# Patient Record
Sex: MALE | NOT HISPANIC OR LATINO
[De-identification: names, ages, dates, MRNs, and addresses within clinical notes are randomized per-mention and may not be internally consistent; named-entity substitution may affect disease eponyms.]

---

## 2022-04-12 PROBLEM — Z00.00 ENCOUNTER FOR PREVENTIVE HEALTH EXAMINATION: Status: ACTIVE | Noted: 2022-04-12

## 2022-04-13 ENCOUNTER — NON-APPOINTMENT (OUTPATIENT)
Age: 77
End: 2022-04-13

## 2022-04-14 ENCOUNTER — APPOINTMENT (OUTPATIENT)
Dept: UROLOGY | Facility: CLINIC | Age: 77
End: 2022-04-14
Payer: MEDICARE

## 2022-04-14 ENCOUNTER — NON-APPOINTMENT (OUTPATIENT)
Age: 77
End: 2022-04-14

## 2022-04-14 VITALS
RESPIRATION RATE: 18 BRPM | HEART RATE: 90 BPM | WEIGHT: 203 LBS | HEIGHT: 71 IN | SYSTOLIC BLOOD PRESSURE: 137 MMHG | TEMPERATURE: 97.5 F | BODY MASS INDEX: 28.42 KG/M2 | DIASTOLIC BLOOD PRESSURE: 87 MMHG

## 2022-04-14 DIAGNOSIS — Z78.9 OTHER SPECIFIED HEALTH STATUS: ICD-10-CM

## 2022-04-14 DIAGNOSIS — E78.5 HYPERLIPIDEMIA, UNSPECIFIED: ICD-10-CM

## 2022-04-14 DIAGNOSIS — Z80.1 FAMILY HISTORY OF MALIGNANT NEOPLASM OF TRACHEA, BRONCHUS AND LUNG: ICD-10-CM

## 2022-04-14 DIAGNOSIS — Z87.891 PERSONAL HISTORY OF NICOTINE DEPENDENCE: ICD-10-CM

## 2022-04-14 DIAGNOSIS — J30.2 OTHER SEASONAL ALLERGIC RHINITIS: ICD-10-CM

## 2022-04-14 DIAGNOSIS — Z82.3 FAMILY HISTORY OF STROKE: ICD-10-CM

## 2022-04-14 LAB
BILIRUB UR QL STRIP: NEGATIVE
CLARITY UR: CLEAR
COLLECTION METHOD: NORMAL
GLUCOSE UR-MCNC: NEGATIVE
HCG UR QL: 0.2 EU/DL
HGB UR QL STRIP.AUTO: NEGATIVE
KETONES UR-MCNC: NEGATIVE
LEUKOCYTE ESTERASE UR QL STRIP: NEGATIVE
NITRITE UR QL STRIP: NEGATIVE
PH UR STRIP: 7
PROT UR STRIP-MCNC: NEGATIVE
SP GR UR STRIP: 1.01

## 2022-04-14 PROCEDURE — 81003 URINALYSIS AUTO W/O SCOPE: CPT | Mod: QW

## 2022-04-14 PROCEDURE — 76857 US EXAM PELVIC LIMITED: CPT

## 2022-04-14 PROCEDURE — 99205 OFFICE O/P NEW HI 60 MIN: CPT

## 2022-04-14 RX ORDER — EVOLOCUMAB 140 MG/ML
140 INJECTION, SOLUTION SUBCUTANEOUS
Refills: 0 | Status: ACTIVE | COMMUNITY

## 2022-04-14 RX ORDER — ASPIRIN 81 MG/1
81 TABLET ORAL
Refills: 0 | Status: ACTIVE | COMMUNITY

## 2022-04-14 NOTE — ADDENDUM
[FreeTextEntry1] : A portion of this note was written by [Zia Robledo] on 04/12/2022 acting as a scribe for Dr. Mckenna. \par \par I have personally reviewed the chart and agree that the record accurately reflects my personal performance of the history, physical exam, assessment, and plan.

## 2022-04-14 NOTE — PHYSICAL EXAM
[General Appearance - Well Developed] : well developed [General Appearance - Well Nourished] : well nourished [Normal Appearance] : normal appearance [Well Groomed] : well groomed [Edema] : no peripheral edema [General Appearance - In No Acute Distress] : no acute distress [Respiration, Rhythm And Depth] : normal respiratory rhythm and effort [Exaggerated Use Of Accessory Muscles For Inspiration] : no accessory muscle use [Abdomen Soft] : soft [Abdomen Tenderness] : non-tender [Costovertebral Angle Tenderness] : no ~M costovertebral angle tenderness [Urinary Bladder Findings] : the bladder was normal on palpation [Urethral Meatus] : meatus normal [Scrotum] : the scrotum was normal [No Prostate Nodules] : no prostate nodules [Testes Mass (___cm)] : there were no testicular masses [Normal Station and Gait] : the gait and station were normal for the patient's age [] : no rash [No Focal Deficits] : no focal deficits [Oriented To Time, Place, And Person] : oriented to person, place, and time [Mood] : the mood was normal [Affect] : the affect was normal [Not Anxious] : not anxious [No Palpable Adenopathy] : no palpable adenopathy [Heart Rate And Rhythm] : Heart rate and rhythm were normal [Abdomen Mass (___ Cm)] : no abdominal mass palpated [Penis Abnormality] : normal uncircumcised penis [Epididymis] : the epididymides were normal [Testes Tenderness] : no tenderness of the testes [Prostate Tenderness] : the prostate was not tender [Skin Color & Pigmentation] : normal skin color and pigmentation [FreeTextEntry1] : Small left hydrocele.

## 2022-04-14 NOTE — ASSESSMENT
[FreeTextEntry1] : I discussed the findings and options with Hakan PEDRO JULIÁN in detail and reviewed the available records.  A repeat PSA is pending.  If the PSA elevation persists, the next step will be a prostate MRI possibly followed by a prostate biopsy. I described these procedures to him, including the risks benefits and alternatives.\par \par No intervention is warranted for his minimal lower urinary tract symptoms.\par \par Regarding erectile dysfunction, I have provided Dr. Salinas with a written prescription for Viagra with the appropriate instructions.\par \par

## 2022-04-14 NOTE — LETTER BODY
[Dear  ___] : Dear  [unfilled], [Consult Letter:] : I had the pleasure of evaluating your patient, [unfilled]. [Please see my note below.] : Please see my note below. [Consult Closing:] : Thank you very much for allowing me to participate in the care of this patient.  If you have any questions, please do not hesitate to contact me. [Sincerely,] : Sincerely, [FreeTextEntry3] : Margarito Mckenna MD, FACS

## 2022-04-14 NOTE — HISTORY OF PRESENT ILLNESS
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  He presents with a recent PSA elevation (see below).\par \par From his general urologic history Dr. Gallego reports moderate stable urinary symptoms (predominantly obstructive) without nocturia.\par IPSS: 8/35\par Sono (performed to assess bladder emptying): \par \par Dr. Gallego has mild erectile dysfunction.  He has used Viagra 50mg in the past.\par \par He is taking DHEA for hypogonadism.\par \par PSAs: 3/10/22--4.9; 2/16/21--2.8; 2/25/20--2.9; 12/18/18--3.5; 11/09/17--4.0; 10/17/16--2.9; 5/19/15--3.0;

## 2022-04-15 ENCOUNTER — NON-APPOINTMENT (OUTPATIENT)
Age: 77
End: 2022-04-15

## 2022-04-15 LAB — PSA SERPL-MCNC: 4.88 NG/ML

## 2022-04-18 LAB — BACTERIA UR CULT: NORMAL

## 2022-04-20 ENCOUNTER — NON-APPOINTMENT (OUTPATIENT)
Age: 77
End: 2022-04-20

## 2022-05-03 ENCOUNTER — APPOINTMENT (OUTPATIENT)
Dept: UROLOGY | Facility: CLINIC | Age: 77
End: 2022-05-03
Payer: MEDICARE

## 2022-05-03 PROCEDURE — 99213 OFFICE O/P EST LOW 20 MIN: CPT | Mod: 95

## 2022-05-04 NOTE — ASSESSMENT
[FreeTextEntry1] : I discussed the findings and options with Mr. PEDRO GALLEGO in detail.\par \par Based on the PI-RADS 2 lesion and normal PSA density, Mr. Gallego can consider either repeating the PSA in four months or proceed with prostate biopsies. \par \par The two biopsy techniques (transrectal and transperineal) were described with their pros and cons. He understands that [facility] requires that the procedure be performed exclusively transperineally using a fusion technique. Currently this is performed in the operating room under general anesthesia.  Biopsy related complications were outlined including bleeding (urinary/rectal/ejaculatory), infection, urosepsis (requiring prompt hospitalization), urinary retention. \par \par He will likely opt to repeat the PSA in late August or early September, understanding the small risk of missing significant lower urinary tract pathology. \par

## 2022-05-04 NOTE — PHYSICAL EXAM
[General Appearance - Well Developed] : well developed [General Appearance - Well Nourished] : well nourished [Normal Appearance] : normal appearance [Well Groomed] : well groomed [General Appearance - In No Acute Distress] : no acute distress [Testes Mass (___cm)] : there were no testicular masses [Oriented To Time, Place, And Person] : oriented to person, place, and time [Affect] : the affect was normal [Mood] : the mood was normal [Not Anxious] : not anxious [FreeTextEntry1] : Small left hydrocele.

## 2022-05-04 NOTE — ADDENDUM
[FreeTextEntry1] : A portion of this note was written by [Zia Robledo] on 05/02/2022 acting as a scribe for Dr. Mckenna. \par \par I have personally reviewed the chart and agree that the record accurately reflects my personal performance of the history, physical exam, assessment, and plan.

## 2024-04-02 PROBLEM — K42.9 UMBILICAL HERNIA: Status: ACTIVE | Noted: 2022-04-14

## 2024-04-02 PROBLEM — R97.20 ELEVATED PSA: Status: ACTIVE | Noted: 2022-04-12

## 2024-04-02 PROBLEM — N43.3 HYDROCELE, LEFT: Status: ACTIVE | Noted: 2022-04-14

## 2024-04-02 PROBLEM — N52.01 ERECTILE DYSFUNCTION DUE TO ARTERIAL INSUFFICIENCY: Status: ACTIVE | Noted: 2022-04-14

## 2024-04-02 PROBLEM — E29.1 HYPOGONADISM IN MALE: Status: ACTIVE | Noted: 2022-04-14

## 2024-04-02 PROBLEM — R39.9 LOWER URINARY TRACT SYMPTOMS (LUTS): Status: ACTIVE | Noted: 2022-04-12

## 2024-04-03 ENCOUNTER — APPOINTMENT (OUTPATIENT)
Dept: UROLOGY | Facility: CLINIC | Age: 79
End: 2024-04-03
Payer: MEDICARE

## 2024-04-03 VITALS
HEART RATE: 99 BPM | SYSTOLIC BLOOD PRESSURE: 131 MMHG | WEIGHT: 203 LBS | DIASTOLIC BLOOD PRESSURE: 86 MMHG | BODY MASS INDEX: 28.31 KG/M2 | OXYGEN SATURATION: 95 %

## 2024-04-03 DIAGNOSIS — N43.3 HYDROCELE, UNSPECIFIED: ICD-10-CM

## 2024-04-03 DIAGNOSIS — N52.01 ERECTILE DYSFUNCTION DUE TO ARTERIAL INSUFFICIENCY: ICD-10-CM

## 2024-04-03 DIAGNOSIS — R97.20 ELEVATED PROSTATE, SPECIFIC ANTIGEN [PSA]: ICD-10-CM

## 2024-04-03 DIAGNOSIS — E29.1 TESTICULAR HYPOFUNCTION: ICD-10-CM

## 2024-04-03 DIAGNOSIS — K42.9 UMBILICAL HERNIA W/OUT OBSTRUCTION OR GANGRENE: ICD-10-CM

## 2024-04-03 DIAGNOSIS — R39.9 UNSPECIFIED SYMPTOMS AND SIGNS INVOLVING THE GENITOURINARY SYSTEM: ICD-10-CM

## 2024-04-03 PROCEDURE — 99215 OFFICE O/P EST HI 40 MIN: CPT | Mod: 25

## 2024-04-03 PROCEDURE — 51798 US URINE CAPACITY MEASURE: CPT

## 2024-04-03 NOTE — PHYSICAL EXAM
[Not Anxious] : not anxious [General Appearance - Well Developed] : well developed [General Appearance - Well Nourished] : well nourished [Normal Appearance] : normal appearance [Well Groomed] : well groomed [General Appearance - In No Acute Distress] : no acute distress [Edema] : no peripheral edema [Respiration, Rhythm And Depth] : normal respiratory rhythm and effort [Exaggerated Use Of Accessory Muscles For Inspiration] : no accessory muscle use [Abdomen Tenderness] : non-tender [Abdomen Soft] : soft [Abdomen Mass (___ Cm)] : no abdominal mass palpated [Costovertebral Angle Tenderness] : no ~M costovertebral angle tenderness [Urethral Meatus] : meatus normal [Penis Abnormality] : normal uncircumcised penis [Urinary Bladder Findings] : the bladder was normal on palpation [Testes Tenderness] : no tenderness of the testes [Testes Mass (___cm)] : there were no testicular masses [Anus Abnormality] : the anus and perineum were normal [Prostate Tenderness] : the prostate was not tender [No Prostate Nodules] : no prostate nodules [Normal Station and Gait] : the gait and station were normal for the patient's age [] : no rash [No Focal Deficits] : no focal deficits [Oriented To Time, Place, And Person] : oriented to person, place, and time [Affect] : the affect was normal [Mood] : the mood was normal [Inguinal Lymph Nodes Enlarged Bilaterally] : inguinal [FreeTextEntry1] : Small left hydrocele. [de-identified] : Small umbilical hernia (reducible) [de-identified] : Small left hydrocele

## 2024-04-03 NOTE — ASSESSMENT
[FreeTextEntry1] : I discussed the findings and options with Mr. PEDRO GALLEGO in detail and reviewed the available PSAs which are essentially normal for his age as well as the PSA velocity.  Consequently, no additional testing is warranted at this time understanding the limitations of not only the PSA but any evaluation for prostate cancer.  Mr. Gallego does not require any intervention for his stable lower urinary tract symptoms.  He will continue on Viagra 100 mg as needed.  He understands that he should have a PSA and digital rectal exam annually, the former test until age 80.  Providing there are no new problems, Mr. Gallego should simply follow-up in 1 year (PSA, sono).

## 2024-04-03 NOTE — LETTER BODY
[Dear  ___] : Dear  [unfilled], [Consult Letter:] : I had the pleasure of evaluating your patient, [unfilled]. [Consult Closing:] : Thank you very much for allowing me to participate in the care of this patient.  If you have any questions, please do not hesitate to contact me. [Please see my note below.] : Please see my note below. [Sincerely,] : Sincerely, [FreeTextEntry3] : Margarito Mckenna MD, FACS

## 2024-04-03 NOTE — HISTORY OF PRESENT ILLNESS
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  He presents with moderate stable urinary symptoms (predominantly obstructive) without nocturia. IPSS: 11 Sono (performed to assess bladder emptying): 1cc PVR  Hakan Gallego has mild erectile dysfunction.  He uses Viagra 100mg, infrequently, with a satisfactory response.  He is taking DHEA for hypogonadism.  PSAs: 3/15/24--4.44; 3/14/23--3.8; 9/13/22--4.5; 4/15/22--4.88*; 3/10/22--4.9; 2/16/21--2.8; 2/25/20--2.9; 12/18/18--3.5; 11/09/17--4.0; 10/17/16--2.9; 5/19/15--3.0  *PSAD: 0.10  MRI: 4/27/22--No lesion suspicious for clinically significant prostate cancer identified. PI-RADS 2, 48cc prostate